# Patient Record
Sex: FEMALE | Race: WHITE | Employment: FULL TIME
[De-identification: names, ages, dates, MRNs, and addresses within clinical notes are randomized per-mention and may not be internally consistent; named-entity substitution may affect disease eponyms.]

---

## 2017-02-09 ENCOUNTER — OFFICE VISIT (OUTPATIENT)
Dept: OBGYN | Facility: CLINIC | Age: 47
End: 2017-02-09

## 2017-02-09 VITALS
RESPIRATION RATE: 18 BRPM | DIASTOLIC BLOOD PRESSURE: 95 MMHG | HEART RATE: 78 BPM | BODY MASS INDEX: 35.68 KG/M2 | WEIGHT: 209 LBS | HEIGHT: 64 IN | SYSTOLIC BLOOD PRESSURE: 136 MMHG

## 2017-02-09 DIAGNOSIS — N93.8 DUB (DYSFUNCTIONAL UTERINE BLEEDING): Primary | Chronic | ICD-10-CM

## 2017-02-09 PROCEDURE — 96372 THER/PROPH/DIAG INJ SC/IM: CPT | Performed by: OBSTETRICS & GYNECOLOGY

## 2017-02-09 RX ORDER — MEDROXYPROGESTERONE ACETATE 150 MG/ML
150 INJECTION, SUSPENSION INTRAMUSCULAR ONCE
Status: COMPLETED | OUTPATIENT
Start: 2017-02-09 | End: 2017-02-09

## 2017-02-09 RX ADMIN — MEDROXYPROGESTERONE ACETATE 150 MG: 150 INJECTION, SUSPENSION INTRAMUSCULAR at 16:20

## 2017-02-09 ASSESSMENT — PATIENT HEALTH QUESTIONNAIRE - PHQ9
2. FEELING DOWN, DEPRESSED OR HOPELESS: 0
SUM OF ALL RESPONSES TO PHQ9 QUESTIONS 1 & 2: 0
1. LITTLE INTEREST OR PLEASURE IN DOING THINGS: 0
SUM OF ALL RESPONSES TO PHQ QUESTIONS 1-9: 0

## 2017-06-15 ENCOUNTER — NURSE ONLY (OUTPATIENT)
Dept: OBGYN CLINIC | Age: 47
End: 2017-06-15
Payer: COMMERCIAL

## 2017-06-15 VITALS
WEIGHT: 199 LBS | DIASTOLIC BLOOD PRESSURE: 85 MMHG | RESPIRATION RATE: 18 BRPM | BODY MASS INDEX: 33.97 KG/M2 | HEIGHT: 64 IN | HEART RATE: 64 BPM | SYSTOLIC BLOOD PRESSURE: 134 MMHG

## 2017-06-15 DIAGNOSIS — N93.8 DUB (DYSFUNCTIONAL UTERINE BLEEDING): Primary | Chronic | ICD-10-CM

## 2017-06-15 DIAGNOSIS — Z30.42 DEPO-PROVERA CONTRACEPTIVE STATUS: ICD-10-CM

## 2017-06-15 LAB
CONTROL: NORMAL
PREGNANCY TEST URINE, POC: NEGATIVE

## 2017-06-15 PROCEDURE — 81025 URINE PREGNANCY TEST: CPT | Performed by: OBSTETRICS & GYNECOLOGY

## 2017-06-19 PROCEDURE — 96372 THER/PROPH/DIAG INJ SC/IM: CPT | Performed by: OBSTETRICS & GYNECOLOGY

## 2017-06-19 RX ORDER — MEDROXYPROGESTERONE ACETATE 150 MG/ML
150 INJECTION, SUSPENSION INTRAMUSCULAR ONCE
Status: COMPLETED | OUTPATIENT
Start: 2017-06-19 | End: 2017-06-19

## 2017-06-19 RX ADMIN — MEDROXYPROGESTERONE ACETATE 150 MG: 150 INJECTION, SUSPENSION INTRAMUSCULAR at 09:17

## 2017-09-05 ENCOUNTER — NURSE ONLY (OUTPATIENT)
Dept: OBGYN CLINIC | Age: 47
End: 2017-09-05
Payer: COMMERCIAL

## 2017-09-05 VITALS — BODY MASS INDEX: 34.33 KG/M2 | WEIGHT: 200 LBS | DIASTOLIC BLOOD PRESSURE: 76 MMHG | SYSTOLIC BLOOD PRESSURE: 140 MMHG

## 2017-09-05 DIAGNOSIS — N93.8 DUB (DYSFUNCTIONAL UTERINE BLEEDING): Primary | Chronic | ICD-10-CM

## 2017-09-05 PROCEDURE — 96372 THER/PROPH/DIAG INJ SC/IM: CPT | Performed by: OBSTETRICS & GYNECOLOGY

## 2017-09-05 RX ORDER — MEDROXYPROGESTERONE ACETATE 150 MG/ML
150 INJECTION, SUSPENSION INTRAMUSCULAR ONCE
Status: COMPLETED | OUTPATIENT
Start: 2017-09-05 | End: 2017-09-05

## 2017-09-05 RX ADMIN — MEDROXYPROGESTERONE ACETATE 150 MG: 150 INJECTION, SUSPENSION INTRAMUSCULAR at 10:11

## 2017-11-28 ENCOUNTER — HOSPITAL ENCOUNTER (OUTPATIENT)
Age: 47
Setting detail: SPECIMEN
Discharge: HOME | End: 2017-11-28
Payer: COMMERCIAL

## 2017-11-28 ENCOUNTER — OFFICE VISIT (OUTPATIENT)
Dept: OBGYN CLINIC | Age: 47
End: 2017-11-28
Payer: COMMERCIAL

## 2017-11-28 VITALS
BODY MASS INDEX: 33.97 KG/M2 | DIASTOLIC BLOOD PRESSURE: 74 MMHG | RESPIRATION RATE: 16 BRPM | SYSTOLIC BLOOD PRESSURE: 126 MMHG | WEIGHT: 199 LBS | HEIGHT: 64 IN

## 2017-11-28 DIAGNOSIS — N93.8 DUB (DYSFUNCTIONAL UTERINE BLEEDING): Primary | Chronic | ICD-10-CM

## 2017-11-28 DIAGNOSIS — Z12.4 ENCOUNTER FOR SCREENING FOR CERVICAL CANCER: ICD-10-CM

## 2017-11-28 DIAGNOSIS — Z12.31 ENCOUNTER FOR SCREENING MAMMOGRAM FOR BREAST CANCER: ICD-10-CM

## 2017-11-28 PROCEDURE — 99396 PREV VISIT EST AGE 40-64: CPT | Performed by: OBSTETRICS & GYNECOLOGY

## 2017-11-28 PROCEDURE — G0145 SCR C/V CYTO,THINLAYER,RESCR: HCPCS

## 2017-11-28 PROCEDURE — 96372 THER/PROPH/DIAG INJ SC/IM: CPT | Performed by: OBSTETRICS & GYNECOLOGY

## 2017-11-28 RX ORDER — MEDROXYPROGESTERONE ACETATE 150 MG/ML
150 INJECTION, SUSPENSION INTRAMUSCULAR ONCE
Status: COMPLETED | OUTPATIENT
Start: 2017-11-28 | End: 2017-11-28

## 2017-11-28 RX ADMIN — MEDROXYPROGESTERONE ACETATE 150 MG: 150 INJECTION, SUSPENSION INTRAMUSCULAR at 16:25

## 2017-11-28 NOTE — PROGRESS NOTES
YEARLY PHYSICAL    Date of service: 2017    Nidhi Wagner  Is a 41 y.o.  single female    PT's PCP is: Fallon Sesay MD     : 1970                                             Subjective:       No LMP recorded. Patient has had an injection. Are your menses regular: not applicable    OB History    Para Term  AB Living   0 0 0 0 0 0   SAB TAB Ectopic Molar Multiple Live Births   0 0 0   0                History   Smoking Status    Former Smoker    Types: Cigarettes    Quit date: 10/27/2009   Smokeless Tobacco    Not on file        History   Alcohol Use    Yes       Family History   Problem Relation Age of Onset    High Blood Pressure Mother     Emphysema Mother     Asthma Mother     Cancer Mother     High Blood Pressure Father     Heart Disease Father        Allergies: Review of patient's allergies indicates no known allergies. Current Outpatient Prescriptions:     clotrimazole-betamethasone (LOTRISONE) 1-0.05 % cream, Apply topically 2 times daily as needed to affected area, Disp: 1 Tube, Rfl: 1    History   Sexual Activity    Sexual activity: Yes    Partners: Male       Any bleeding or pain with intercourse: NA    Last Yearly:  2016    Last pap: 2016    Last HPV:     Last Mammogram:     Last Dexascan     Do you do self breast exams: Yes    Past Medical History:   Diagnosis Date    Allergic     Irregular uterine bleeding     Narcolepsy        History reviewed. No pertinent surgical history. Family History   Problem Relation Age of Onset    High Blood Pressure Mother     Emphysema Mother     Asthma Mother     Cancer Mother     High Blood Pressure Father     Heart Disease Father        Chief Complaint   Patient presents with    Gynecologic Exam     yearly & DEPO          PE:  Vital Signs  There were no vitals taken for this visit.      Labs:    No results found for this visit on

## 2017-12-06 LAB — CYTOLOGY REPORT: NORMAL

## 2018-02-20 ENCOUNTER — OFFICE VISIT (OUTPATIENT)
Dept: OBGYN CLINIC | Age: 48
End: 2018-02-20
Payer: COMMERCIAL

## 2018-02-20 DIAGNOSIS — N93.8 DUB (DYSFUNCTIONAL UTERINE BLEEDING): Primary | Chronic | ICD-10-CM

## 2018-02-20 PROCEDURE — 96372 THER/PROPH/DIAG INJ SC/IM: CPT | Performed by: OBSTETRICS & GYNECOLOGY

## 2018-02-20 RX ORDER — MEDROXYPROGESTERONE ACETATE 150 MG/ML
150 INJECTION, SUSPENSION INTRAMUSCULAR ONCE
Status: COMPLETED | OUTPATIENT
Start: 2018-02-20 | End: 2018-02-20

## 2018-02-20 RX ADMIN — MEDROXYPROGESTERONE ACETATE 150 MG: 150 INJECTION, SUSPENSION INTRAMUSCULAR at 16:14

## 2019-01-08 ENCOUNTER — OFFICE VISIT (OUTPATIENT)
Dept: OBGYN CLINIC | Age: 49
End: 2019-01-08
Payer: COMMERCIAL

## 2019-01-08 VITALS
DIASTOLIC BLOOD PRESSURE: 87 MMHG | RESPIRATION RATE: 16 BRPM | WEIGHT: 194 LBS | SYSTOLIC BLOOD PRESSURE: 151 MMHG | BODY MASS INDEX: 33.12 KG/M2 | HEIGHT: 64 IN | HEART RATE: 64 BPM

## 2019-01-08 DIAGNOSIS — N93.8 DUB (DYSFUNCTIONAL UTERINE BLEEDING): Primary | Chronic | ICD-10-CM

## 2019-01-08 PROCEDURE — 96372 THER/PROPH/DIAG INJ SC/IM: CPT | Performed by: OBSTETRICS & GYNECOLOGY

## 2019-01-08 RX ORDER — MEDROXYPROGESTERONE ACETATE 150 MG/ML
150 INJECTION, SUSPENSION INTRAMUSCULAR ONCE
Status: COMPLETED | OUTPATIENT
Start: 2019-01-08 | End: 2019-01-08

## 2019-01-08 RX ADMIN — MEDROXYPROGESTERONE ACETATE 150 MG: 150 INJECTION, SUSPENSION INTRAMUSCULAR at 16:07

## 2020-03-03 ENCOUNTER — HOSPITAL ENCOUNTER (OUTPATIENT)
Age: 50
Setting detail: SPECIMEN
Discharge: HOME | End: 2020-03-03
Payer: COMMERCIAL

## 2020-03-03 ENCOUNTER — OFFICE VISIT (OUTPATIENT)
Dept: OBGYN CLINIC | Age: 50
End: 2020-03-03
Payer: COMMERCIAL

## 2020-03-03 VITALS
DIASTOLIC BLOOD PRESSURE: 72 MMHG | SYSTOLIC BLOOD PRESSURE: 152 MMHG | HEIGHT: 64 IN | WEIGHT: 197 LBS | BODY MASS INDEX: 33.63 KG/M2

## 2020-03-03 LAB
ESTIMATED AVERAGE GLUCOSE: 108 MG/DL
HBA1C MFR BLD: 5.4 % (ref 4.8–5.9)
TSH SERPL DL<=0.05 MIU/L-ACNC: 1.86 MIU/L (ref 0.3–5)

## 2020-03-03 PROCEDURE — 84443 ASSAY THYROID STIM HORMONE: CPT

## 2020-03-03 PROCEDURE — 84146 ASSAY OF PROLACTIN: CPT

## 2020-03-03 PROCEDURE — 83002 ASSAY OF GONADOTROPIN (LH): CPT

## 2020-03-03 PROCEDURE — 83036 HEMOGLOBIN GLYCOSYLATED A1C: CPT

## 2020-03-03 PROCEDURE — G0145 SCR C/V CYTO,THINLAYER,RESCR: HCPCS

## 2020-03-03 PROCEDURE — 83001 ASSAY OF GONADOTROPIN (FSH): CPT

## 2020-03-03 PROCEDURE — 96372 THER/PROPH/DIAG INJ SC/IM: CPT | Performed by: OBSTETRICS & GYNECOLOGY

## 2020-03-03 PROCEDURE — 99396 PREV VISIT EST AGE 40-64: CPT | Performed by: OBSTETRICS & GYNECOLOGY

## 2020-03-03 RX ORDER — MEDROXYPROGESTERONE ACETATE 150 MG/ML
150 INJECTION, SUSPENSION INTRAMUSCULAR ONCE
Status: COMPLETED | OUTPATIENT
Start: 2020-03-03 | End: 2020-03-03

## 2020-03-03 RX ADMIN — MEDROXYPROGESTERONE ACETATE 150 MG: 150 INJECTION, SUSPENSION INTRAMUSCULAR at 16:39

## 2020-03-03 NOTE — PROGRESS NOTES
YEARLY PHYSICAL    Date of service: 3/3/2020    Guevara Bolanos  Is a 54 y.o.  single female    PT's PCP is: Garret Ragland MD     : 1970                                             Subjective:       No LMP recorded (lmp unknown). Patient is perimenopausal.     Are your menses regular: no    OB History    Para Term  AB Living   0 0 0 0 0 0   SAB TAB Ectopic Molar Multiple Live Births   0 0 0   0          Social History     Tobacco Use   Smoking Status Former Smoker    Types: Cigarettes    Last attempt to quit: 10/27/2009    Years since quitting: 10.3   Smokeless Tobacco Never Used        Social History     Substance and Sexual Activity   Alcohol Use Yes       Family History   Problem Relation Age of Onset    High Blood Pressure Mother     Emphysema Mother     Asthma Mother     Cancer Mother     High Blood Pressure Father     Heart Disease Father        Allergies: Patient has no known allergies. Current Outpatient Medications:     clotrimazole-betamethasone (LOTRISONE) 1-0.05 % cream, Apply topically 2 times daily as needed to affected area (Patient not taking: Reported on 3/3/2020), Disp: 1 Tube, Rfl: 1    Social History     Substance and Sexual Activity   Sexual Activity Not Currently    Partners: Male       Any bleeding or pain with intercourse: No    Last Yearly:  2017    Last pap: 2017    Last HPV: never    Last Mammogram: 2015    Last Dexascan never    Do you do self breast exams: Yes    Past Medical History:   Diagnosis Date    Allergic     Irregular uterine bleeding     Narcolepsy        History reviewed. No pertinent surgical history.     Family History   Problem Relation Age of Onset    High Blood Pressure Mother     Emphysema Mother     Asthma Mother     Cancer Mother     High Blood Pressure Father     Heart Disease Father        Any family history of breast or ovarian cancer:

## 2020-03-04 LAB
FOLLICLE STIMULATING HORMONE: 41.9 U/L (ref 1.7–21.5)
LH: 14.9 U/L (ref 1–95.6)
PROLACTIN: 40.09 UG/L (ref 4.79–23.3)

## 2020-03-11 ENCOUNTER — HOSPITAL ENCOUNTER (OUTPATIENT)
Dept: MAMMOGRAPHY | Age: 50
Discharge: HOME | End: 2020-03-13
Payer: COMMERCIAL

## 2020-03-11 PROCEDURE — 77067 SCR MAMMO BI INCL CAD: CPT

## 2020-03-13 LAB — CYTOLOGY REPORT: NORMAL

## 2020-05-26 ENCOUNTER — OFFICE VISIT (OUTPATIENT)
Dept: OBGYN CLINIC | Age: 50
End: 2020-05-26
Payer: COMMERCIAL

## 2020-05-26 VITALS
BODY MASS INDEX: 33.12 KG/M2 | HEIGHT: 64 IN | DIASTOLIC BLOOD PRESSURE: 78 MMHG | RESPIRATION RATE: 18 BRPM | HEART RATE: 88 BPM | WEIGHT: 194 LBS | TEMPERATURE: 97.8 F | SYSTOLIC BLOOD PRESSURE: 138 MMHG

## 2020-05-26 PROCEDURE — 96372 THER/PROPH/DIAG INJ SC/IM: CPT | Performed by: OBSTETRICS & GYNECOLOGY

## 2020-05-26 RX ORDER — MEDROXYPROGESTERONE ACETATE 150 MG/ML
150 INJECTION, SUSPENSION INTRAMUSCULAR ONCE
Status: COMPLETED | OUTPATIENT
Start: 2020-05-26 | End: 2020-05-26

## 2020-05-26 RX ADMIN — MEDROXYPROGESTERONE ACETATE 150 MG: 150 INJECTION, SUSPENSION INTRAMUSCULAR at 13:59

## 2020-05-26 NOTE — PROGRESS NOTES
Nurse visit Depo    Date of service: 2020    Celeste Anaya  Is a 54 y.o. single female    PT's PCP is: Jeffy Ayala MD     : 1970                                             Subjective:       No LMP recorded. Patient is perimenopausal.     Allergies: Patient has no known allergies. Chief Complaint   Patient presents with    Injections     Depo provera 150mg IM right deltoid. Last yearly: 2020    Last pap: 2020     Last HPV:never  ( if due for pap schedule pap)    LAST DEPO: 2020 ( if past 13 weeks and not on period please talk with provider)    PE:  Vital Signs  Blood pressure 138/78, pulse 88, temperature 97.8 °F (36.6 °C), temperature source Infrared, resp. rate 18, height 5' 4\" (1.626 m), weight 194 lb (88 kg). Labs:    No results found for this visit on 20. Patient denies fever, chills, nausea and vomiting                            Assessment and Plan          Diagnosis Orders   1.  Dysmenorrhea  medroxyPROGESTERone (DEPO-PROVERA) injection 150 mg         Depo was: Office supplied      NURSE: Kelly Fontenot LPN

## 2020-08-11 ENCOUNTER — OFFICE VISIT (OUTPATIENT)
Dept: OBGYN CLINIC | Age: 50
End: 2020-08-11
Payer: COMMERCIAL

## 2020-08-11 VITALS
HEIGHT: 64 IN | SYSTOLIC BLOOD PRESSURE: 144 MMHG | BODY MASS INDEX: 32.27 KG/M2 | WEIGHT: 189 LBS | TEMPERATURE: 97 F | DIASTOLIC BLOOD PRESSURE: 86 MMHG

## 2020-08-11 PROCEDURE — 96372 THER/PROPH/DIAG INJ SC/IM: CPT | Performed by: OBSTETRICS & GYNECOLOGY

## 2020-08-11 RX ORDER — MEDROXYPROGESTERONE ACETATE 150 MG/ML
150 INJECTION, SUSPENSION INTRAMUSCULAR ONCE
Status: COMPLETED | OUTPATIENT
Start: 2020-08-11 | End: 2020-08-11

## 2020-08-11 RX ADMIN — MEDROXYPROGESTERONE ACETATE 150 MG: 150 INJECTION, SUSPENSION INTRAMUSCULAR at 16:15

## 2020-08-11 NOTE — PROGRESS NOTES
Nurse visit Depo    Date of service: 2020    Lilian Jamison  Is a 54 y.o.  female    PT's PCP is: Nick Arora MD     : 1970                                             Subjective:       No LMP recorded. Patient has had an injection. Allergies: Patient has no known allergies. Chief Complaint   Patient presents with    Injections     Depo Provera 150mg IM Left Delt       Last yearly: 3/3/20    Last pap: 3/3/20     Last HPV:never  ( if due for pap schedule pap)    LAST DEPO: 20 ( if past 13 weeks and not on period please talk with provider)    PE:  Vital Signs  Blood pressure (!) 144/86, temperature 97 °F (36.1 °C), temperature source Infrared, height 5' 4\" (1.626 m), weight 189 lb (85.7 kg). Labs:    No results found for this visit on 20. No  PT denies fever, chills, nausea and vomiting                            Assessment and Plan          Diagnosis Orders   1.  Dysmenorrhea  medroxyPROGESTERone (DEPO-PROVERA) injection 150 mg         Depo was: Office supplied      NURSE: MARY LOU

## 2020-11-03 ENCOUNTER — OFFICE VISIT (OUTPATIENT)
Dept: OBGYN CLINIC | Age: 50
End: 2020-11-03
Payer: COMMERCIAL

## 2020-11-03 VITALS
WEIGHT: 192 LBS | HEIGHT: 64 IN | DIASTOLIC BLOOD PRESSURE: 86 MMHG | SYSTOLIC BLOOD PRESSURE: 134 MMHG | BODY MASS INDEX: 32.78 KG/M2

## 2020-11-03 PROCEDURE — 96372 THER/PROPH/DIAG INJ SC/IM: CPT | Performed by: OBSTETRICS & GYNECOLOGY

## 2020-11-03 PROCEDURE — 99211 OFF/OP EST MAY X REQ PHY/QHP: CPT | Performed by: OBSTETRICS & GYNECOLOGY

## 2020-11-03 RX ORDER — MEDROXYPROGESTERONE ACETATE 150 MG/ML
150 INJECTION, SUSPENSION INTRAMUSCULAR ONCE
Status: COMPLETED | OUTPATIENT
Start: 2020-11-03 | End: 2020-11-03

## 2020-11-03 RX ADMIN — MEDROXYPROGESTERONE ACETATE 150 MG: 150 INJECTION, SUSPENSION INTRAMUSCULAR at 15:58

## 2021-02-02 ENCOUNTER — OFFICE VISIT (OUTPATIENT)
Dept: OBGYN CLINIC | Age: 51
End: 2021-02-02
Payer: COMMERCIAL

## 2021-02-02 VITALS
HEIGHT: 64 IN | BODY MASS INDEX: 34.15 KG/M2 | DIASTOLIC BLOOD PRESSURE: 80 MMHG | SYSTOLIC BLOOD PRESSURE: 126 MMHG | WEIGHT: 200 LBS

## 2021-02-02 DIAGNOSIS — N94.6 DYSMENORRHEA: Primary | ICD-10-CM

## 2021-02-02 PROCEDURE — 99212 OFFICE O/P EST SF 10 MIN: CPT | Performed by: OBSTETRICS & GYNECOLOGY

## 2021-02-02 PROCEDURE — 96372 THER/PROPH/DIAG INJ SC/IM: CPT | Performed by: OBSTETRICS & GYNECOLOGY

## 2021-02-02 RX ORDER — MEDROXYPROGESTERONE ACETATE 150 MG/ML
150 INJECTION, SUSPENSION INTRAMUSCULAR ONCE
Status: COMPLETED | OUTPATIENT
Start: 2021-02-02 | End: 2021-02-02

## 2021-02-02 RX ADMIN — MEDROXYPROGESTERONE ACETATE 150 MG: 150 INJECTION, SUSPENSION INTRAMUSCULAR at 16:00

## 2021-02-02 NOTE — PROGRESS NOTES
Nurse visit Depo    Date of service: 2021    Sonal Garcia  Is a 52 y.o.  female    PT's PCP is: Andrew Cui MD     : 1970                                             Subjective:       No LMP recorded. Patient has had an injection. Allergies: Patient has no known allergies. Chief Complaint   Patient presents with    Injections     Depo Provera 150mg IM given Left Delt office supplied       Last yearly: 3/3/20    Last pap: 3/3/20     Last HPV:never  ( if due for pap schedule pap)    LAST DEPO: 11/3/20 ( if past 13 weeks and not on period please talk with provider)    PE:  Vital Signs  Blood pressure 126/80, height 5' 4\" (1.626 m), weight 200 lb (90.7 kg). Labs:    No results found for this visit on 21. Yes  PT denies fever, chills, nausea and vomiting                            Assessment and Plan          Diagnosis Orders   1.  Dysmenorrhea  medroxyPROGESTERone (DEPO-PROVERA) injection 150 mg         Depo was: Office supplied      NURSE: deandre

## 2021-04-27 ENCOUNTER — NURSE ONLY (OUTPATIENT)
Dept: OBGYN CLINIC | Age: 51
End: 2021-04-27
Payer: COMMERCIAL

## 2021-04-27 VITALS
DIASTOLIC BLOOD PRESSURE: 78 MMHG | HEIGHT: 64 IN | BODY MASS INDEX: 33.97 KG/M2 | WEIGHT: 199 LBS | SYSTOLIC BLOOD PRESSURE: 120 MMHG

## 2021-04-27 DIAGNOSIS — N94.6 DYSMENORRHEA: Primary | ICD-10-CM

## 2021-04-27 PROCEDURE — 96372 THER/PROPH/DIAG INJ SC/IM: CPT | Performed by: OBSTETRICS & GYNECOLOGY

## 2021-04-27 PROCEDURE — 99212 OFFICE O/P EST SF 10 MIN: CPT | Performed by: OBSTETRICS & GYNECOLOGY

## 2021-04-27 RX ORDER — MEDROXYPROGESTERONE ACETATE 150 MG/ML
150 INJECTION, SUSPENSION INTRAMUSCULAR ONCE
Status: COMPLETED | OUTPATIENT
Start: 2021-04-27 | End: 2021-04-27

## 2021-04-27 RX ADMIN — MEDROXYPROGESTERONE ACETATE 150 MG: 150 INJECTION, SUSPENSION INTRAMUSCULAR at 16:04

## 2021-04-27 NOTE — PROGRESS NOTES
Nurse visit Depo    Date of service: 2021    Javon Potter  Is a 52 y.o.  female    PT's PCP is: Rahel Leslie MD     : 1970                                             Subjective:       No LMP recorded. Patient has had an injection. Allergies: Patient has no known allergies. Chief Complaint   Patient presents with    Injections     Depo Provera 150mg IM given Rt Delt office supplied       Last yearly: 3/3/20    Last pap: 3/3/20     Last HPV:never  ( if due for pap schedule pap)    LAST DEPO: 21 ( if past 13 weeks and not on period please talk with provider)    PE:  Vital Signs  Blood pressure 120/78, height 5' 4\" (1.626 m), weight 199 lb (90.3 kg). Labs:    No results found for this visit on 21. Yes  PT denies fever, chills, nausea and vomiting                            Assessment and Plan          Diagnosis Orders   1.  Dysmenorrhea  medroxyPROGESTERone (DEPO-PROVERA) injection 150 mg         Depo was: Office supplied      NURSE: deandre

## 2021-07-13 ENCOUNTER — NURSE ONLY (OUTPATIENT)
Dept: OBGYN CLINIC | Age: 51
End: 2021-07-13
Payer: COMMERCIAL

## 2021-07-13 VITALS — TEMPERATURE: 98.2 F | HEIGHT: 64 IN | BODY MASS INDEX: 33.12 KG/M2 | WEIGHT: 194 LBS

## 2021-07-13 DIAGNOSIS — N94.6 DYSMENORRHEA: Primary | ICD-10-CM

## 2021-07-13 PROCEDURE — 96372 THER/PROPH/DIAG INJ SC/IM: CPT | Performed by: OBSTETRICS & GYNECOLOGY

## 2021-07-13 RX ORDER — MEDROXYPROGESTERONE ACETATE 150 MG/ML
150 INJECTION, SUSPENSION INTRAMUSCULAR ONCE
Status: COMPLETED | OUTPATIENT
Start: 2021-07-13 | End: 2021-07-13

## 2021-07-13 RX ADMIN — MEDROXYPROGESTERONE ACETATE 150 MG: 150 INJECTION, SUSPENSION INTRAMUSCULAR at 16:14

## 2021-07-13 NOTE — PROGRESS NOTES
Nurse visit Depo    Date of service: 2021    Austen Reed  Is a 52 y.o.  female    PT's PCP is: Marlin Butt MD     : 1970                                             Subjective:       No LMP recorded. Patient has had an injection. Allergies: Patient has no known allergies. Chief Complaint   Patient presents with    Injections     Depo Provera 150mg IM, left deltoid. Office supplied. Last yearly: 2020    Last pap: 2020     Last HPV: never  ( if due for pap schedule pap)    LAST DEPO: 2021 ( if past 13 weeks and not on period please talk with provider)    PE:  Vital Signs  Temperature 98.2 °F (36.8 °C), temperature source Infrared, height 5' 4\" (1.626 m), weight 194 lb (88 kg). Labs:    No results found for this visit on 21. Yes  PT denies fever, chills, nausea and vomiting                            Assessment and Plan          Diagnosis Orders   1.  Dysmenorrhea  medroxyPROGESTERone (DEPO-PROVERA) injection 150 mg         Depo was: Office supplied      NURSE: Kailey Gaines LPN